# Patient Record
Sex: FEMALE | Race: WHITE | NOT HISPANIC OR LATINO | URBAN - METROPOLITAN AREA
[De-identification: names, ages, dates, MRNs, and addresses within clinical notes are randomized per-mention and may not be internally consistent; named-entity substitution may affect disease eponyms.]

---

## 2024-03-15 ENCOUNTER — ANESTHESIA (OUTPATIENT)
Dept: PERIOP | Facility: HOSPITAL | Age: 54
End: 2024-03-15
Payer: COMMERCIAL

## 2024-03-15 ENCOUNTER — HOSPITAL ENCOUNTER (OUTPATIENT)
Facility: HOSPITAL | Age: 54
Setting detail: OUTPATIENT SURGERY
Discharge: HOME | End: 2024-03-15
Attending: COLON & RECTAL SURGERY | Admitting: COLON & RECTAL SURGERY
Payer: COMMERCIAL

## 2024-03-15 ENCOUNTER — ANESTHESIA EVENT (OUTPATIENT)
Dept: PERIOP | Facility: HOSPITAL | Age: 54
End: 2024-03-15
Payer: COMMERCIAL

## 2024-03-15 VITALS
HEIGHT: 67 IN | BODY MASS INDEX: 42.38 KG/M2 | RESPIRATION RATE: 18 BRPM | WEIGHT: 270 LBS | OXYGEN SATURATION: 95 % | TEMPERATURE: 97.9 F | DIASTOLIC BLOOD PRESSURE: 78 MMHG | SYSTOLIC BLOOD PRESSURE: 148 MMHG | HEART RATE: 86 BPM

## 2024-03-15 DIAGNOSIS — K60.3 ANAL FISTULA: Primary | ICD-10-CM

## 2024-03-15 PROBLEM — N20.0 NEPHROLITHIASIS: Status: ACTIVE | Noted: 2024-03-15

## 2024-03-15 PROBLEM — R51.9 HEADACHE: Status: ACTIVE | Noted: 2024-03-15

## 2024-03-15 PROBLEM — F41.9 ANXIETY: Status: ACTIVE | Noted: 2024-03-15

## 2024-03-15 LAB
EXT PREGNANCY TEST URINE: NEGATIVE
EXT. CONTROL: NORMAL

## 2024-03-15 PROCEDURE — 81025 URINE PREGNANCY TEST: CPT | Performed by: ANESTHESIOLOGY

## 2024-03-15 RX ORDER — MIDAZOLAM HYDROCHLORIDE 2 MG/2ML
INJECTION, SOLUTION INTRAMUSCULAR; INTRAVENOUS AS NEEDED
Status: DISCONTINUED | OUTPATIENT
Start: 2024-03-15 | End: 2024-03-15

## 2024-03-15 RX ORDER — MAGNESIUM HYDROXIDE 1200 MG/15ML
LIQUID ORAL AS NEEDED
Status: DISCONTINUED | OUTPATIENT
Start: 2024-03-15 | End: 2024-03-15 | Stop reason: HOSPADM

## 2024-03-15 RX ORDER — OXYCODONE HYDROCHLORIDE 5 MG/1
5 TABLET ORAL EVERY 4 HOURS PRN
Qty: 30 TABLET | Refills: 0 | Status: SHIPPED | OUTPATIENT
Start: 2024-03-15 | End: 2024-03-25

## 2024-03-15 RX ORDER — PROPRANOLOL HCL 60 MG
60 CAPSULE, EXTENDED RELEASE 24HR ORAL DAILY
COMMUNITY
Start: 2023-12-20

## 2024-03-15 RX ORDER — ONDANSETRON 2 MG/ML
INJECTION INTRAMUSCULAR; INTRAVENOUS AS NEEDED
Status: DISCONTINUED | OUTPATIENT
Start: 2024-03-15 | End: 2024-03-15

## 2024-03-15 RX ORDER — DEXMEDETOMIDINE HYDROCHLORIDE 100 UG/ML
INJECTION, SOLUTION INTRAVENOUS AS NEEDED
Status: DISCONTINUED | OUTPATIENT
Start: 2024-03-15 | End: 2024-03-15

## 2024-03-15 RX ORDER — FENTANYL CITRATE 50 UG/ML
INJECTION, SOLUTION INTRAMUSCULAR; INTRAVENOUS AS NEEDED
Status: DISCONTINUED | OUTPATIENT
Start: 2024-03-15 | End: 2024-03-15

## 2024-03-15 RX ORDER — ONDANSETRON 2 MG/ML
4 INJECTION INTRAMUSCULAR; INTRAVENOUS ONCE AS NEEDED
Status: DISCONTINUED | OUTPATIENT
Start: 2024-03-15 | End: 2024-03-15 | Stop reason: HOSPADM

## 2024-03-15 RX ORDER — SODIUM CHLORIDE, SODIUM LACTATE, POTASSIUM CHLORIDE, CALCIUM CHLORIDE 600; 310; 30; 20 MG/100ML; MG/100ML; MG/100ML; MG/100ML
INJECTION, SOLUTION INTRAVENOUS CONTINUOUS PRN
Status: DISCONTINUED | OUTPATIENT
Start: 2024-03-15 | End: 2024-03-15

## 2024-03-15 RX ORDER — PROMETHAZINE HYDROCHLORIDE 25 MG/ML
12.5 INJECTION, SOLUTION INTRAMUSCULAR; INTRAVENOUS ONCE AS NEEDED
Status: DISCONTINUED | OUTPATIENT
Start: 2024-03-15 | End: 2024-03-15 | Stop reason: HOSPADM

## 2024-03-15 RX ORDER — ELETRIPTAN HYDROBROMIDE 40 MG/1
TABLET, FILM COATED ORAL
COMMUNITY

## 2024-03-15 RX ORDER — FENTANYL CITRATE/PF 50 MCG/ML
25 SYRINGE (ML) INJECTION
Status: COMPLETED | OUTPATIENT
Start: 2024-03-15 | End: 2024-03-15

## 2024-03-15 RX ORDER — DEXAMETHASONE SODIUM PHOSPHATE 10 MG/ML
INJECTION, SOLUTION INTRAMUSCULAR; INTRAVENOUS AS NEEDED
Status: DISCONTINUED | OUTPATIENT
Start: 2024-03-15 | End: 2024-03-15

## 2024-03-15 RX ORDER — LIDOCAINE HYDROCHLORIDE 10 MG/ML
INJECTION, SOLUTION EPIDURAL; INFILTRATION; INTRACAUDAL; PERINEURAL AS NEEDED
Status: DISCONTINUED | OUTPATIENT
Start: 2024-03-15 | End: 2024-03-15

## 2024-03-15 RX ORDER — SUMATRIPTAN 100 MG/1
TABLET, FILM COATED ORAL
COMMUNITY
Start: 2023-12-28

## 2024-03-15 RX ORDER — OXYCODONE HYDROCHLORIDE AND ACETAMINOPHEN 5; 325 MG/1; MG/1
1 TABLET ORAL EVERY 4 HOURS PRN
Status: DISCONTINUED | OUTPATIENT
Start: 2024-03-15 | End: 2024-03-15 | Stop reason: HOSPADM

## 2024-03-15 RX ORDER — TRIAMCINOLONE ACETONIDE 55 UG/1
2 SPRAY, METERED NASAL DAILY
COMMUNITY

## 2024-03-15 RX ORDER — SUCCINYLCHOLINE/SOD CL,ISO/PF 100 MG/5ML
SYRINGE (ML) INTRAVENOUS AS NEEDED
Status: DISCONTINUED | OUTPATIENT
Start: 2024-03-15 | End: 2024-03-15

## 2024-03-15 RX ORDER — PROPOFOL 10 MG/ML
INJECTION, EMULSION INTRAVENOUS AS NEEDED
Status: DISCONTINUED | OUTPATIENT
Start: 2024-03-15 | End: 2024-03-15

## 2024-03-15 RX ADMIN — FENTANYL CITRATE 25 MCG: 50 INJECTION, SOLUTION INTRAMUSCULAR; INTRAVENOUS at 13:33

## 2024-03-15 RX ADMIN — PROPOFOL 50 MG: 10 INJECTION, EMULSION INTRAVENOUS at 12:13

## 2024-03-15 RX ADMIN — DEXMEDETOMIDINE HYDROCHLORIDE 8 MCG: 100 INJECTION, SOLUTION INTRAVENOUS at 12:15

## 2024-03-15 RX ADMIN — FENTANYL CITRATE 25 MCG: 50 INJECTION, SOLUTION INTRAMUSCULAR; INTRAVENOUS at 13:08

## 2024-03-15 RX ADMIN — FENTANYL CITRATE 50 MCG: 50 INJECTION, SOLUTION INTRAMUSCULAR; INTRAVENOUS at 12:15

## 2024-03-15 RX ADMIN — MIDAZOLAM 2 MG: 1 INJECTION INTRAMUSCULAR; INTRAVENOUS at 12:02

## 2024-03-15 RX ADMIN — FENTANYL CITRATE 25 MCG: 50 INJECTION, SOLUTION INTRAMUSCULAR; INTRAVENOUS at 13:17

## 2024-03-15 RX ADMIN — Medication 100 MG: at 12:09

## 2024-03-15 RX ADMIN — OXYCODONE HYDROCHLORIDE AND ACETAMINOPHEN 1 TABLET: 5; 325 TABLET ORAL at 14:22

## 2024-03-15 RX ADMIN — SODIUM CHLORIDE, SODIUM LACTATE, POTASSIUM CHLORIDE, AND CALCIUM CHLORIDE: .6; .31; .03; .02 INJECTION, SOLUTION INTRAVENOUS at 12:02

## 2024-03-15 RX ADMIN — DEXAMETHASONE SODIUM PHOSPHATE 10 MG: 10 INJECTION, SOLUTION INTRAMUSCULAR; INTRAVENOUS at 12:09

## 2024-03-15 RX ADMIN — FENTANYL CITRATE 25 MCG: 50 INJECTION, SOLUTION INTRAMUSCULAR; INTRAVENOUS at 13:41

## 2024-03-15 RX ADMIN — LIDOCAINE HYDROCHLORIDE 50 MG: 10 INJECTION, SOLUTION EPIDURAL; INFILTRATION; INTRACAUDAL; PERINEURAL at 12:09

## 2024-03-15 RX ADMIN — PROPOFOL 200 MG: 10 INJECTION, EMULSION INTRAVENOUS at 12:15

## 2024-03-15 RX ADMIN — DEXMEDETOMIDINE HYDROCHLORIDE 8 MCG: 100 INJECTION, SOLUTION INTRAVENOUS at 12:09

## 2024-03-15 RX ADMIN — ONDANSETRON 4 MG: 2 INJECTION INTRAMUSCULAR; INTRAVENOUS at 12:36

## 2024-03-15 RX ADMIN — PROPOFOL 250 MG: 10 INJECTION, EMULSION INTRAVENOUS at 12:09

## 2024-03-15 RX ADMIN — FENTANYL CITRATE 50 MCG: 50 INJECTION, SOLUTION INTRAMUSCULAR; INTRAVENOUS at 12:09

## 2024-03-15 NOTE — INTERVAL H&P NOTE
H&P reviewed. After examining the patient I find no changes in the patients condition since the H&P had been written.    Vitals:    03/15/24 1049   BP: 148/65   Pulse: 79   Resp: 16   Temp: (!) 97.1 °F (36.2 °C)   SpO2: 96%

## 2024-03-15 NOTE — DISCHARGE SUMMARY
[facility]                                                                                 DISCHARGE SUMMARY        PATIENT NAME: Faith Maravilla    :  1970  MRN: [id number]  Pt Location:  OR ROOM 08    DISCHARGE DATE: 3/15/2024    PROCEDURE: Procedure(s) (LRB):  EXAM UNDER ANESTHESIA (EUA),fistulotomy and injection of botox (N/A)    Anesthesia Type: General    Complications: None    Specimen(s):  * No specimens in log *    HOSPITAL COURSE: The patient was admitted for elective procedure. The procedure was uncomplicated and the the patient was discharged home post procedure.        SIGNATURE: Seven Garcia MD  DATE: March 15, 2024  TIME: 12:50 PM

## 2024-03-15 NOTE — ANESTHESIA POSTPROCEDURE EVALUATION
Post-Op Assessment Note    CV Status:  Stable  Pain Score: 0    Pain management: adequate       Mental Status:  Alert and awake   Hydration Status:  Euvolemic   PONV Controlled:  Controlled   Airway Patency:  Patent     Post Op Vitals Reviewed: Yes    No anethesia notable event occurred.    Staff: CRNA               BP      Temp 98.4 °F (36.9 °C) (03/15/24 1251)    Pulse 74 (03/15/24 1251)   Resp 16 (03/15/24 1251)    SpO2   94

## 2024-03-15 NOTE — ANESTHESIA PREPROCEDURE EVALUATION
Procedure:  EXAM UNDER ANESTHESIA (EUA) (Anus)  POSS SPHINCTEROTOMY (Anus)  INCISION AND DRAINAGE (I&D) PERIANAL ABCSESS (Anus)    Relevant Problems   /RENAL   (+) Nephrolithiasis      NEURO/PSYCH   (+) Anxiety   (+) Headache      BMI 42     Latest Reference Range & Units 03/15/24 11:04   PREGNANCY TEST URINE Negative  Negative     Physical Exam    Airway    Mallampati score: II  TM Distance: >3 FB  Neck ROM: full     Dental   No notable dental hx     Cardiovascular      Pulmonary      Other Findings  post-pubertal.      Anesthesia Plan  ASA Score- 3     Anesthesia Type- general with ASA Monitors.         Additional Monitors:     Airway Plan: ETT.           Plan Factors-Exercise tolerance (METS): >4 METS.    Chart reviewed.   Existing labs reviewed. Patient summary reviewed.    Patient is not a current smoker.  Patient did not smoke on day of surgery.            Induction- intravenous.    Postoperative Plan- Plan for postoperative opioid use. Planned trial extubation    Informed Consent- Anesthetic plan and risks discussed with patient.  I personally reviewed this patient with the CRNA. Discussed and agreed on the Anesthesia Plan with the CRNA..

## 2024-03-15 NOTE — NURSING NOTE
Pt is awake,alert, tolerated diet OOB to BR, voided, written and verbal instructions given to pt and her S/O, who verbalize an understanding, sitz bath sent with pt, with instructions on use. Pain medication effective for pain relief.

## 2024-03-15 NOTE — H&P
[facility]  HISTORY AND PHYSICAL EXAM  Faith Maravilla 53 y.o. female MRN: [id number]  Unit/Bed#: OR [facility] Encounter: [id number]    Assessment/Plan     Indication for surgery: perirectal abscess, possible fistula possible fissure    Plan:  Exam under anesthesia, drainage of perirectal abscess    Review of Systems    Historical Information   Past Medical History:   Diagnosis Date    Anxiety     Cervical herniated disc     Heart palpitations     Irritable bowel syndrome     Kidney stone     Lyme disease 2023    Migraines      Past Surgical History:   Procedure Laterality Date    CHOLECYSTECTOMY      EGD      LIPOMA RESECTION Left     under left breast    LIPOMA RESECTION N/A     Back of neck     Social History   Social History     Substance and Sexual Activity   Alcohol Use Yes    Comment: 3 or 4 glasses of wine per year     Social History     Substance and Sexual Activity   Drug Use Never     Social History     Tobacco Use   Smoking Status Former    Current packs/day: 0.00    Types: Cigarettes    Quit date:     Years since quittin.2   Smokeless Tobacco Never     Family History:   Family History   Problem Relation Age of Onset    Stroke Mother     Cancer Father     Kidney disease Father     Heart disease Father     Heart disease Brother     Cancer Maternal Grandfather     Heart disease Paternal Grandmother     Kidney disease Paternal Grandfather     Cancer Paternal Grandfather     Heart disease Paternal Grandfather        Meds/Allergies   PTA meds:   Prior to Admission Medications   Prescriptions Last Dose Informant Patient Reported? Taking?   SUMAtriptan (IMITREX) 100 mg tablet Unknown  Yes No   Si tablet po at onset of migraine and may repeat x1 in 2 hours; max= 2 in 24 hours.  Limit use to no more than 9 times per month   Triamcinolone Acetonide (Nasacort Allergy) 55 MCG/ACT nasal spray Unknown  Yes No   Si sprays into each nostril daily   eletriptan (RELPAX) 40  "MG tablet 3/10/2024  Yes No   Sig: PLEASE SEE ATTACHED FOR DETAILED DIRECTIONS   propranolol (INDERAL LA) 60 mg 24 hr capsule 3/14/2024 at 2030  Yes Yes   Sig: Take 60 mg by mouth daily      Facility-Administered Medications: None     No Known Allergies    Objective   First Vitals:   Blood Pressure: 148/65 (03/15/24 1049)  Pulse: 79 (03/15/24 1049)  Temperature: (!) 97.1 °F (36.2 °C) (03/15/24 1049)  Temp Source: Temporal (03/15/24 1049)  Respirations: 16 (03/15/24 1049)  Height: 5' 7\" (170.2 cm) (03/15/24 1049)  Weight - Scale: 122 kg (270 lb) (03/15/24 1049)  SpO2: 96 % (03/15/24 1049)    Current Vitals:   Blood Pressure: 148/65 (03/15/24 1049)  Pulse: 79 (03/15/24 1049)  Temperature: (!) 97.1 °F (36.2 °C) (03/15/24 1049)  Temp Source: Temporal (03/15/24 1049)  Respirations: 16 (03/15/24 1049)  Height: 5' 7\" (170.2 cm) (03/15/24 1049)  Weight - Scale: 122 kg (270 lb) (03/15/24 1049)  SpO2: 96 % (03/15/24 1049)    No intake or output data in the 24 hours ending 03/15/24 1141    Invasive Devices       None                   Physical Exam    HEENT: Normocephalic, atraumatic  Lungs: Clear  Heart: Regular rate and rhythm  Abdomen: Soft. Nondistended. Nontender  Extremities: No edema    Lab Results: CBC: No results found for: \"WBC\", \"HGB\", \"HCT\", \"MCV\", \"PLT\", \"ADJUSTEDWBC\", \"RBC\", \"MCH\", \"MCHC\", \"RDW\", \"MPV\", \"NRBC\"                     "

## 2024-03-15 NOTE — OP NOTE
OPERATIVE REPORT  PATIENT NAME: Faith Maravilla    :  1970  MRN: [id number]  Pt Location:  OR ROOM 08    SURGERY DATE: 3/15/2024    Preoperative Dx: Anal pain and suspected perianal abscess    Postoperative Dx: Same    Procedure: Exam under anesthesia, superficial fistulotomy, fissurectomy, injection of Botox.    Surgeon: Dr. 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  She was treated conservatively with diltiazem ointment.  Her symptoms initially improved but recently over the past few weeks has been having worsening pain.  She was seen in the office yesterday and on exam appeared to have pus draining from the posterior midline.  She presents for exam under anesthesia.    The patient was brought into the operating room on a transfer stretcher.  She was intubated on the stretcher and then transferred to the OR bed in a prone jackknife position.  The buttocks retracted with cloth tape.  The area was prepped and draped.  She was given an anal block with lidocaine Marcaine epinephrine and bicarbonate.    The Coker retractor was placed.  There is a very inflamed posterior anal fissure with granulation and exposed sphincter muscle.  There appears to be a tract going externally off the midline on the right side.  I was able to probe this with a crypt hook demonstrating a superficial fistula tract and abscess that was opened with the electrocautery.  The external skin tags were removed with the electrocautery.  This left a pretty sizable wound external to the anal canal.  It did not appear to involve significant sphincter muscle.  For this reason I felt the best to perform a procedure for the anal fissure.  Rather than perform  a sphincterotomy I injected the internal sphincter with Botox.  I diluted 100 units in 10 cc and injected this into all 4 quadrants.  Hemostasis was achieved with the electrocautery.    A Telfa dressing was applied.  The patient was transferred to the stretcher.  She was extubated.  She was transported recovery room in a stable condition.                Attestation: I was present for the entire procedure.      Patient Disposition: PACU      SIGNATURE: Seven Garcia MD  DATE: March 15, 2024  TIME: 12:46 PM

## (undated) DEVICE — SYRINGE 10ML LL CONTROL TOP

## (undated) DEVICE — MEDI-VAC YANK SUCT HNDL W/TPRD BULBOUS TIP: Brand: CARDINAL HEALTH

## (undated) DEVICE — BASIC PACK: Brand: CONVERTORS

## (undated) DEVICE — NEEDLE BLUNT 18 G X 1 1/2IN

## (undated) DEVICE — NEEDLE 25G X 1 1/2

## (undated) DEVICE — LUBRICANT JELLY SURGILUBE TUBE 4OZ FLIP TOP

## (undated) DEVICE — MINOR PROCEDURE DRAPE: Brand: CONVERTORS

## (undated) DEVICE — PENCIL ELECTROSURG E-Z CLEAN -0035H

## (undated) DEVICE — SPONGE 4 X 4 XRAY 16 PLY STRL LF RFD

## (undated) DEVICE — SYRINGE 50ML LL

## (undated) DEVICE — BULB SYRINGE,IRRIGATION WITH PROTECTIVE CAP: Brand: DOVER

## (undated) DEVICE — POV-IOD SOLUTION 4OZ BT

## (undated) DEVICE — 3M™ DURAPORE™ SURGICAL TAPE 1538-3, 3 INCH X 10 YARD (7,5CM X 9,1M), 4 ROLLS/BOX: Brand: 3M™ DURAPORE™

## (undated) DEVICE — STERILE POLYISOPRENE POWDER-FREE SURGICAL GLOVES WITH EMOLLIENT COATING: Brand: PROTEXIS

## (undated) DEVICE — LIGHT GLOVE GREEN

## (undated) DEVICE — DISPOSABLE OR TOWEL: Brand: CARDINAL HEALTH

## (undated) DEVICE — SKIN MARKER DUAL TIP WITH RULER CAP, FLEXIBLE RULER AND LABELS: Brand: DEVON

## (undated) DEVICE — SINGLE PORT MANIFOLD: Brand: NEPTUNE 2

## (undated) DEVICE — BASIC SINGLE BASIN-LF: Brand: MEDLINE INDUSTRIES, INC.

## (undated) DEVICE — INTENDED FOR TISSUE SEPARATION, AND OTHER PROCEDURES THAT REQUIRE A SHARP SURGICAL BLADE TO PUNCTURE OR CUT.: Brand: BARD-PARKER SAFETY BLADES SIZE 10, STERILE

## (undated) DEVICE — STERILE POLYISOPRENE POWDER-FREE SURGICAL GLOVES: Brand: PROTEXIS

## (undated) DEVICE — TUBING SUCTION 5MM X 12 FT

## (undated) DEVICE — PREMIUM DRY TRAY LF: Brand: MEDLINE INDUSTRIES, INC.

## (undated) DEVICE — TELFA NON-ADHERENT ABSORBENT DRESSING: Brand: TELFA

## (undated) DEVICE — 1820 FOAM BLOCK NEEDLE COUNTER: Brand: DEVON